# Patient Record
Sex: MALE | Race: WHITE | NOT HISPANIC OR LATINO | Employment: UNEMPLOYED | URBAN - METROPOLITAN AREA
[De-identification: names, ages, dates, MRNs, and addresses within clinical notes are randomized per-mention and may not be internally consistent; named-entity substitution may affect disease eponyms.]

---

## 2018-06-19 ENCOUNTER — NON-PROVIDER VISIT (OUTPATIENT)
Dept: URGENT CARE | Facility: CLINIC | Age: 58
End: 2018-06-19

## 2018-06-19 DIAGNOSIS — Z02.1 PRE-EMPLOYMENT DRUG SCREENING: ICD-10-CM

## 2018-06-19 LAB
AMP AMPHETAMINE: NORMAL
BAR BARBITURATES: NORMAL
BZO BENZODIAZEPINES: NORMAL
COC COCAINE: NORMAL
INT CON NEG: NORMAL
INT CON POS: NORMAL
MDMA ECSTASY: NORMAL
MET METHAMPHETAMINES: NORMAL
MTD METHADONE: NORMAL
OPI OPIATES: NORMAL
OXY OXYCODONE: NORMAL
PCP PHENCYCLIDINE: NORMAL
POC URINE DRUG SCREEN OCDRS: NEGATIVE
THC: NORMAL

## 2018-06-19 PROCEDURE — 80305 DRUG TEST PRSMV DIR OPT OBS: CPT | Performed by: PHYSICIAN ASSISTANT

## 2018-07-12 ENCOUNTER — NON-PROVIDER VISIT (OUTPATIENT)
Dept: URGENT CARE | Facility: PHYSICIAN GROUP | Age: 58
End: 2018-07-12

## 2018-07-12 DIAGNOSIS — Z02.1 PRE-EMPLOYMENT DRUG SCREENING: ICD-10-CM

## 2018-07-12 LAB
AMP AMPHETAMINE: NORMAL
COC COCAINE: NORMAL
INT CON NEG: NORMAL
INT CON POS: NORMAL
MET METHAMPHETAMINES: NORMAL
OPI OPIATES: NORMAL
PCP PHENCYCLIDINE: NORMAL
POC DRUG COMMENT 753798-OCCUPATIONAL HEALTH: NORMAL
THC: NORMAL

## 2018-07-12 PROCEDURE — 80305 DRUG TEST PRSMV DIR OPT OBS: CPT | Performed by: FAMILY MEDICINE

## 2020-09-29 ENCOUNTER — HOSPITAL ENCOUNTER (OUTPATIENT)
Dept: RADIOLOGY | Facility: MEDICAL CENTER | Age: 60
End: 2020-09-29
Attending: STUDENT IN AN ORGANIZED HEALTH CARE EDUCATION/TRAINING PROGRAM
Payer: COMMERCIAL

## 2020-09-29 DIAGNOSIS — M25.561 RIGHT KNEE PAIN, UNSPECIFIED CHRONICITY: ICD-10-CM

## 2020-09-29 PROCEDURE — 73721 MRI JNT OF LWR EXTRE W/O DYE: CPT | Mod: RT

## 2020-11-25 ENCOUNTER — PRE-ADMISSION TESTING (OUTPATIENT)
Dept: ADMISSIONS | Facility: MEDICAL CENTER | Age: 60
End: 2020-11-25
Attending: ORTHOPAEDIC SURGERY
Payer: COMMERCIAL

## 2020-11-25 DIAGNOSIS — Z01.812 PRE-OPERATIVE LABORATORY EXAMINATION: ICD-10-CM

## 2020-11-25 DIAGNOSIS — Z01.810 PRE-OPERATIVE CARDIOVASCULAR EXAMINATION: ICD-10-CM

## 2020-11-25 LAB
ANION GAP SERPL CALC-SCNC: 9 MMOL/L (ref 7–16)
BUN SERPL-MCNC: 12 MG/DL (ref 8–22)
CALCIUM SERPL-MCNC: 8.9 MG/DL (ref 8.4–10.2)
CHLORIDE SERPL-SCNC: 104 MMOL/L (ref 96–112)
CO2 SERPL-SCNC: 25 MMOL/L (ref 20–33)
COVID ORDER STATUS COVID19: NORMAL
CREAT SERPL-MCNC: 0.87 MG/DL (ref 0.5–1.4)
EKG IMPRESSION: NORMAL
GLUCOSE SERPL-MCNC: 95 MG/DL (ref 65–99)
POTASSIUM SERPL-SCNC: 4.9 MMOL/L (ref 3.6–5.5)
SODIUM SERPL-SCNC: 138 MMOL/L (ref 135–145)

## 2020-11-25 PROCEDURE — 80048 BASIC METABOLIC PNL TOTAL CA: CPT

## 2020-11-25 PROCEDURE — 36415 COLL VENOUS BLD VENIPUNCTURE: CPT

## 2020-11-25 PROCEDURE — 93005 ELECTROCARDIOGRAM TRACING: CPT

## 2020-11-25 PROCEDURE — U0003 INFECTIOUS AGENT DETECTION BY NUCLEIC ACID (DNA OR RNA); SEVERE ACUTE RESPIRATORY SYNDROME CORONAVIRUS 2 (SARS-COV-2) (CORONAVIRUS DISEASE [COVID-19]), AMPLIFIED PROBE TECHNIQUE, MAKING USE OF HIGH THROUGHPUT TECHNOLOGIES AS DESCRIBED BY CMS-2020-01-R: HCPCS

## 2020-11-25 PROCEDURE — C9803 HOPD COVID-19 SPEC COLLECT: HCPCS

## 2020-11-25 PROCEDURE — 93010 ELECTROCARDIOGRAM REPORT: CPT | Performed by: INTERNAL MEDICINE

## 2020-11-25 RX ORDER — ATORVASTATIN CALCIUM 40 MG/1
40 TABLET, FILM COATED ORAL
COMMUNITY
Start: 2020-11-20 | End: 2021-10-06

## 2020-11-25 RX ORDER — MELOXICAM 7.5 MG/1
7.5 TABLET ORAL
COMMUNITY
Start: 2020-09-01 | End: 2020-11-25

## 2020-11-25 RX ORDER — ACETAMINOPHEN 325 MG/1
650 TABLET ORAL EVERY 4 HOURS PRN
COMMUNITY
End: 2021-10-06

## 2020-11-25 RX ORDER — MELOXICAM 15 MG/1
15 TABLET ORAL
COMMUNITY
Start: 2020-11-20 | End: 2020-11-25

## 2020-11-25 NOTE — OR NURSING
"Preadmit appointment: \" Preparing for your Procedure information\" sheet given to patient with verbal and written instructions. Patient instructed to continue prescribed medications through the day before surgery, instructed to take the following medications the day of surgery per anesthesia protocol:  Tylenol PRN.  Pt states, \"no issues with anesthesia\".  Anesthesia Fasting guidelines handout given to Pt, NPO 12 hours prior to surgery and clear liquids up to 3 hours prior to surgery, clear liquids defined for Pt    All Pt's questions and concerns answered or addressed.  COVID test 11/25  "

## 2020-11-27 LAB
SARS-COV-2 RNA RESP QL NAA+PROBE: NOTDETECTED
SPECIMEN SOURCE: NORMAL

## 2020-11-30 ENCOUNTER — ANESTHESIA EVENT (OUTPATIENT)
Dept: SURGERY | Facility: MEDICAL CENTER | Age: 60
End: 2020-11-30
Payer: COMMERCIAL

## 2020-11-30 ENCOUNTER — HOSPITAL ENCOUNTER (OUTPATIENT)
Facility: MEDICAL CENTER | Age: 60
End: 2020-11-30
Attending: ORTHOPAEDIC SURGERY | Admitting: ORTHOPAEDIC SURGERY
Payer: COMMERCIAL

## 2020-11-30 ENCOUNTER — ANESTHESIA (OUTPATIENT)
Dept: SURGERY | Facility: MEDICAL CENTER | Age: 60
End: 2020-11-30
Payer: COMMERCIAL

## 2020-11-30 VITALS
SYSTOLIC BLOOD PRESSURE: 129 MMHG | TEMPERATURE: 97.7 F | OXYGEN SATURATION: 92 % | HEIGHT: 68 IN | RESPIRATION RATE: 16 BRPM | HEART RATE: 79 BPM | BODY MASS INDEX: 33.28 KG/M2 | DIASTOLIC BLOOD PRESSURE: 73 MMHG | WEIGHT: 219.58 LBS

## 2020-11-30 DIAGNOSIS — S83.281A CURRENT TEAR OF LATERAL CARTILAGE OR MENISCUS OF KNEE, RIGHT, INITIAL ENCOUNTER: ICD-10-CM

## 2020-11-30 PROCEDURE — 700111 HCHG RX REV CODE 636 W/ 250 OVERRIDE (IP): Performed by: ANESTHESIOLOGY

## 2020-11-30 PROCEDURE — 160041 HCHG SURGERY MINUTES - EA ADDL 1 MIN LEVEL 4: Performed by: ORTHOPAEDIC SURGERY

## 2020-11-30 PROCEDURE — L1830 KO IMMOB CANVAS LONG PRE OTS: HCPCS | Performed by: ORTHOPAEDIC SURGERY

## 2020-11-30 PROCEDURE — 160029 HCHG SURGERY MINUTES - 1ST 30 MINS LEVEL 4: Performed by: ORTHOPAEDIC SURGERY

## 2020-11-30 PROCEDURE — 160009 HCHG ANES TIME/MIN: Performed by: ORTHOPAEDIC SURGERY

## 2020-11-30 PROCEDURE — A9270 NON-COVERED ITEM OR SERVICE: HCPCS | Performed by: ANESTHESIOLOGY

## 2020-11-30 PROCEDURE — 502580 HCHG PACK, KNEE ARTHROSCOPY: Performed by: ORTHOPAEDIC SURGERY

## 2020-11-30 PROCEDURE — 700105 HCHG RX REV CODE 258: Performed by: ORTHOPAEDIC SURGERY

## 2020-11-30 PROCEDURE — 501838 HCHG SUTURE GENERAL: Performed by: ORTHOPAEDIC SURGERY

## 2020-11-30 PROCEDURE — E0114 CRUTCH UNDERARM PAIR NO WOOD: HCPCS | Performed by: ORTHOPAEDIC SURGERY

## 2020-11-30 PROCEDURE — 160002 HCHG RECOVERY MINUTES (STAT): Performed by: ORTHOPAEDIC SURGERY

## 2020-11-30 PROCEDURE — 160035 HCHG PACU - 1ST 60 MINS PHASE I: Performed by: ORTHOPAEDIC SURGERY

## 2020-11-30 PROCEDURE — 700111 HCHG RX REV CODE 636 W/ 250 OVERRIDE (IP): Performed by: ORTHOPAEDIC SURGERY

## 2020-11-30 PROCEDURE — 700101 HCHG RX REV CODE 250: Performed by: ORTHOPAEDIC SURGERY

## 2020-11-30 PROCEDURE — 700101 HCHG RX REV CODE 250: Performed by: ANESTHESIOLOGY

## 2020-11-30 PROCEDURE — 160046 HCHG PACU - 1ST 60 MINS PHASE II: Performed by: ORTHOPAEDIC SURGERY

## 2020-11-30 PROCEDURE — 160025 RECOVERY II MINUTES (STATS): Performed by: ORTHOPAEDIC SURGERY

## 2020-11-30 PROCEDURE — 160048 HCHG OR STATISTICAL LEVEL 1-5: Performed by: ORTHOPAEDIC SURGERY

## 2020-11-30 PROCEDURE — 700102 HCHG RX REV CODE 250 W/ 637 OVERRIDE(OP): Performed by: ANESTHESIOLOGY

## 2020-11-30 RX ORDER — CELECOXIB 200 MG/1
400 CAPSULE ORAL ONCE
Status: COMPLETED | OUTPATIENT
Start: 2020-11-30 | End: 2020-11-30

## 2020-11-30 RX ORDER — OXYCODONE HCL 5 MG/5 ML
10 SOLUTION, ORAL ORAL
Status: COMPLETED | OUTPATIENT
Start: 2020-11-30 | End: 2020-11-30

## 2020-11-30 RX ORDER — HYDROMORPHONE HYDROCHLORIDE 1 MG/ML
0.2 INJECTION, SOLUTION INTRAMUSCULAR; INTRAVENOUS; SUBCUTANEOUS
Status: DISCONTINUED | OUTPATIENT
Start: 2020-11-30 | End: 2020-11-30 | Stop reason: HOSPADM

## 2020-11-30 RX ORDER — MIDAZOLAM HYDROCHLORIDE 1 MG/ML
1 INJECTION INTRAMUSCULAR; INTRAVENOUS
Status: DISCONTINUED | OUTPATIENT
Start: 2020-11-30 | End: 2020-11-30 | Stop reason: HOSPADM

## 2020-11-30 RX ORDER — ACETAMINOPHEN 500 MG
1000 TABLET ORAL ONCE
Status: COMPLETED | OUTPATIENT
Start: 2020-11-30 | End: 2020-11-30

## 2020-11-30 RX ORDER — ONDANSETRON 2 MG/ML
4 INJECTION INTRAMUSCULAR; INTRAVENOUS
Status: DISCONTINUED | OUTPATIENT
Start: 2020-11-30 | End: 2020-11-30 | Stop reason: HOSPADM

## 2020-11-30 RX ORDER — HALOPERIDOL 5 MG/ML
1 INJECTION INTRAMUSCULAR
Status: DISCONTINUED | OUTPATIENT
Start: 2020-11-30 | End: 2020-11-30 | Stop reason: HOSPADM

## 2020-11-30 RX ORDER — HYDRALAZINE HYDROCHLORIDE 20 MG/ML
5 INJECTION INTRAMUSCULAR; INTRAVENOUS
Status: DISCONTINUED | OUTPATIENT
Start: 2020-11-30 | End: 2020-11-30 | Stop reason: HOSPADM

## 2020-11-30 RX ORDER — BUPIVACAINE HYDROCHLORIDE 2.5 MG/ML
INJECTION, SOLUTION EPIDURAL; INFILTRATION; INTRACAUDAL
Status: DISCONTINUED | OUTPATIENT
Start: 2020-11-30 | End: 2020-11-30 | Stop reason: HOSPADM

## 2020-11-30 RX ORDER — ONDANSETRON 2 MG/ML
INJECTION INTRAMUSCULAR; INTRAVENOUS PRN
Status: DISCONTINUED | OUTPATIENT
Start: 2020-11-30 | End: 2020-11-30 | Stop reason: SURG

## 2020-11-30 RX ORDER — HYDROMORPHONE HYDROCHLORIDE 1 MG/ML
0.1 INJECTION, SOLUTION INTRAMUSCULAR; INTRAVENOUS; SUBCUTANEOUS
Status: DISCONTINUED | OUTPATIENT
Start: 2020-11-30 | End: 2020-11-30 | Stop reason: HOSPADM

## 2020-11-30 RX ORDER — MEPERIDINE HYDROCHLORIDE 25 MG/ML
12.5 INJECTION INTRAMUSCULAR; INTRAVENOUS; SUBCUTANEOUS
Status: DISCONTINUED | OUTPATIENT
Start: 2020-11-30 | End: 2020-11-30 | Stop reason: HOSPADM

## 2020-11-30 RX ORDER — KETOROLAC TROMETHAMINE 30 MG/ML
INJECTION, SOLUTION INTRAMUSCULAR; INTRAVENOUS PRN
Status: DISCONTINUED | OUTPATIENT
Start: 2020-11-30 | End: 2020-11-30 | Stop reason: SURG

## 2020-11-30 RX ORDER — SODIUM CHLORIDE, SODIUM LACTATE, POTASSIUM CHLORIDE, CALCIUM CHLORIDE 600; 310; 30; 20 MG/100ML; MG/100ML; MG/100ML; MG/100ML
INJECTION, SOLUTION INTRAVENOUS CONTINUOUS
Status: DISCONTINUED | OUTPATIENT
Start: 2020-11-30 | End: 2020-11-30 | Stop reason: HOSPADM

## 2020-11-30 RX ORDER — METOPROLOL TARTRATE 1 MG/ML
1 INJECTION, SOLUTION INTRAVENOUS
Status: DISCONTINUED | OUTPATIENT
Start: 2020-11-30 | End: 2020-11-30 | Stop reason: HOSPADM

## 2020-11-30 RX ORDER — MIDAZOLAM HYDROCHLORIDE 1 MG/ML
INJECTION INTRAMUSCULAR; INTRAVENOUS PRN
Status: DISCONTINUED | OUTPATIENT
Start: 2020-11-30 | End: 2020-11-30 | Stop reason: SURG

## 2020-11-30 RX ORDER — CEFAZOLIN SODIUM 1 G/3ML
INJECTION, POWDER, FOR SOLUTION INTRAMUSCULAR; INTRAVENOUS PRN
Status: DISCONTINUED | OUTPATIENT
Start: 2020-11-30 | End: 2020-11-30 | Stop reason: SURG

## 2020-11-30 RX ORDER — OXYCODONE HCL 5 MG/5 ML
5 SOLUTION, ORAL ORAL
Status: COMPLETED | OUTPATIENT
Start: 2020-11-30 | End: 2020-11-30

## 2020-11-30 RX ORDER — DIPHENHYDRAMINE HYDROCHLORIDE 50 MG/ML
12.5 INJECTION INTRAMUSCULAR; INTRAVENOUS
Status: DISCONTINUED | OUTPATIENT
Start: 2020-11-30 | End: 2020-11-30 | Stop reason: HOSPADM

## 2020-11-30 RX ORDER — GABAPENTIN 300 MG/1
300 CAPSULE ORAL ONCE
Status: COMPLETED | OUTPATIENT
Start: 2020-11-30 | End: 2020-11-30

## 2020-11-30 RX ORDER — HYDROMORPHONE HYDROCHLORIDE 1 MG/ML
0.4 INJECTION, SOLUTION INTRAMUSCULAR; INTRAVENOUS; SUBCUTANEOUS
Status: DISCONTINUED | OUTPATIENT
Start: 2020-11-30 | End: 2020-11-30 | Stop reason: HOSPADM

## 2020-11-30 RX ORDER — LABETALOL HYDROCHLORIDE 5 MG/ML
5 INJECTION, SOLUTION INTRAVENOUS
Status: DISCONTINUED | OUTPATIENT
Start: 2020-11-30 | End: 2020-11-30 | Stop reason: HOSPADM

## 2020-11-30 RX ORDER — LIDOCAINE HYDROCHLORIDE 20 MG/ML
INJECTION, SOLUTION EPIDURAL; INFILTRATION; INTRACAUDAL; PERINEURAL PRN
Status: DISCONTINUED | OUTPATIENT
Start: 2020-11-30 | End: 2020-11-30 | Stop reason: HOSPADM

## 2020-11-30 RX ORDER — EPINEPHRINE 1 MG/ML(1)
AMPUL (ML) INJECTION
Status: DISCONTINUED | OUTPATIENT
Start: 2020-11-30 | End: 2020-11-30 | Stop reason: HOSPADM

## 2020-11-30 RX ORDER — DEXAMETHASONE SODIUM PHOSPHATE 4 MG/ML
INJECTION, SOLUTION INTRA-ARTICULAR; INTRALESIONAL; INTRAMUSCULAR; INTRAVENOUS; SOFT TISSUE PRN
Status: DISCONTINUED | OUTPATIENT
Start: 2020-11-30 | End: 2020-11-30 | Stop reason: SURG

## 2020-11-30 RX ADMIN — FENTANYL CITRATE 100 MCG: 50 INJECTION, SOLUTION INTRAMUSCULAR; INTRAVENOUS at 12:57

## 2020-11-30 RX ADMIN — OXYCODONE HYDROCHLORIDE 10 MG: 5 SOLUTION ORAL at 14:17

## 2020-11-30 RX ADMIN — ONDANSETRON 4 MG: 2 INJECTION INTRAMUSCULAR; INTRAVENOUS at 13:43

## 2020-11-30 RX ADMIN — PROPOFOL 160 MG: 10 INJECTION, EMULSION INTRAVENOUS at 13:00

## 2020-11-30 RX ADMIN — FENTANYL CITRATE 50 MCG: 50 INJECTION, SOLUTION INTRAMUSCULAR; INTRAVENOUS at 13:21

## 2020-11-30 RX ADMIN — ACETAMINOPHEN 1000 MG: 500 TABLET ORAL at 10:56

## 2020-11-30 RX ADMIN — LIDOCAINE HYDROCHLORIDE 100 MG: 20 INJECTION, SOLUTION EPIDURAL; INFILTRATION; INTRACAUDAL; PERINEURAL at 13:00

## 2020-11-30 RX ADMIN — GABAPENTIN 300 MG: 300 CAPSULE ORAL at 10:56

## 2020-11-30 RX ADMIN — DEXAMETHASONE SODIUM PHOSPHATE 4 MG: 4 INJECTION, SOLUTION INTRAMUSCULAR; INTRAVENOUS at 13:00

## 2020-11-30 RX ADMIN — EPHEDRINE SULFATE 5 MG: 50 INJECTION INTRAMUSCULAR; INTRAVENOUS; SUBCUTANEOUS at 13:32

## 2020-11-30 RX ADMIN — CEFAZOLIN 2 G: 1 INJECTION, POWDER, FOR SOLUTION INTRAVENOUS at 13:00

## 2020-11-30 RX ADMIN — SODIUM CHLORIDE, POTASSIUM CHLORIDE, SODIUM LACTATE AND CALCIUM CHLORIDE: 600; 310; 30; 20 INJECTION, SOLUTION INTRAVENOUS at 10:57

## 2020-11-30 RX ADMIN — SODIUM CHLORIDE, POTASSIUM CHLORIDE, SODIUM LACTATE AND CALCIUM CHLORIDE: 600; 310; 30; 20 INJECTION, SOLUTION INTRAVENOUS at 13:43

## 2020-11-30 RX ADMIN — FENTANYL CITRATE 50 MCG: 50 INJECTION, SOLUTION INTRAMUSCULAR; INTRAVENOUS at 13:25

## 2020-11-30 RX ADMIN — FENTANYL CITRATE 50 MCG: 50 INJECTION, SOLUTION INTRAMUSCULAR; INTRAVENOUS at 14:18

## 2020-11-30 RX ADMIN — EPHEDRINE SULFATE 5 MG: 50 INJECTION INTRAMUSCULAR; INTRAVENOUS; SUBCUTANEOUS at 13:21

## 2020-11-30 RX ADMIN — KETOROLAC TROMETHAMINE 30 MG: 30 INJECTION, SOLUTION INTRAMUSCULAR at 13:43

## 2020-11-30 RX ADMIN — CELECOXIB 400 MG: 200 CAPSULE ORAL at 10:56

## 2020-11-30 RX ADMIN — WATER 15 ML: 100 IRRIGANT IRRIGATION at 10:56

## 2020-11-30 RX ADMIN — MIDAZOLAM HYDROCHLORIDE 2 MG: 1 INJECTION, SOLUTION INTRAMUSCULAR; INTRAVENOUS at 12:55

## 2020-11-30 ASSESSMENT — PAIN DESCRIPTION - PAIN TYPE
TYPE: SURGICAL PAIN
TYPE: ACUTE PAIN;SURGICAL PAIN
TYPE: SURGICAL PAIN
TYPE: SURGICAL PAIN
TYPE: ACUTE PAIN;SURGICAL PAIN

## 2020-11-30 ASSESSMENT — PAIN SCALES - GENERAL: PAIN_LEVEL: 5

## 2020-11-30 NOTE — OR NURSING
1351- Patient arrived from OR post ARTHROSCOPY, KNEE - DEBRIDEMENT - Right; CHONDROPLASTY - FOR OPEN BIPARTITE PATELLA EXCISION - Right. Respirations spontaneous and unlabored via oral airway. VSS, see flowsheets. Surgical dressing to R knee C/D/I with immobilizer in place. 2+ bilateral pedal pulses. Toes warm and pink with brisk cap refill.   1417- Patient reporting 8/10 R knee pain. Medicated per MAR. O2 discontinued at this time.   1424- Patient reporting improvement in pain. Currently 7/10 burning pain. Declines need for additional pain medication at this time.   1448- Patient continues to report tolerable pain and declines need for additional pain medication. Meets criteria for Stage 2.

## 2020-11-30 NOTE — ANESTHESIA PROCEDURE NOTES
Airway    Date/Time: 11/30/2020 1:01 PM  Performed by: Lionel Dodd M.D.  Authorized by: Lionel Dodd M.D.     Location:  OR  Urgency:  Elective  Indications for Airway Management:  Anesthesia      Spontaneous Ventilation: absent    Sedation Level:  Deep  Preoxygenated: Yes    Mask Difficulty Assessment:  1 - vent by mask  Final Airway Type:  Supraglottic airway  Final Supraglottic Airway:  Standard LMA    SGA Size:  5  Number of Attempts at Approach:  1

## 2020-11-30 NOTE — OR NURSING
1448: Patient arrives in phase II.    1515: Patient provided with crutches and education for use with demo.    1520: D/Karthik to care of family post uneventful stay in PACU 2.

## 2020-11-30 NOTE — ANESTHESIA POSTPROCEDURE EVALUATION
Patient: Migel Owusu    Procedure Summary     Date: 11/30/20 Room / Location:  OR  / SURGERY [facility]    Anesthesia Start: 1255 Anesthesia Stop: 1356    Procedures:       ARTHROSCOPY, KNEE - DEBRIDEMENT (Right Knee)      CHONDROPLASTY - FOR OPEN BIPARTITE PATELLA EXCISION (Right Knee) Diagnosis: (BIFID PATELLA)    Surgeons: Navdeep Aguilar M.D. Responsible Provider: Lionel Dodd M.D.    Anesthesia Type: general ASA Status: 2          Final Anesthesia Type: general  Last vitals  BP   Blood Pressure: 129/73    Temp   36.5 °C (97.7 °F)    Pulse   Pulse: 79   Resp   16    SpO2   92 %      Anesthesia Post Evaluation    Patient location during evaluation: PACU  Patient participation: complete - patient participated  Level of consciousness: awake and alert  Pain score: 5    Airway patency: patent  Anesthetic complications: no  Cardiovascular status: hemodynamically stable  Respiratory status: acceptable  Hydration status: euvolemic    PONV: none

## 2020-11-30 NOTE — ANESTHESIA TIME REPORT
Anesthesia Start and Stop Event Times     Date Time Event    11/30/2020 1240 Ready for Procedure     1255 Anesthesia Start     1356 Anesthesia Stop        Responsible Staff  11/30/20    Name Role Begin End    Lionel Dodd M.D. Anesth 1255 1356        Preop Diagnosis (Free Text):  Pre-op Diagnosis     BIFID PATELLA        Preop Diagnosis (Codes):    Post op Diagnosis  Bifid patella      Premium Reason  Non-Premium    Comments:

## 2020-11-30 NOTE — ANESTHESIA QCDR
2019 [facility] Clinical Data Registry (for Quality Improvement)     Postoperative nausea/vomiting risk protocol (Adult = 18 yrs and Pediatric 3-17 yrs)- (430 and 463)  General inhalation anesthetic (NOT TIVA) with PONV risk factors: No  Provision of anti-emetic therapy with at least 2 different classes of agents: N/A  Patient DID NOT receive anti-emetic therapy and reason is documented in Medical Record: N/A    Multimodal Pain Management- (477)  Non-emergent surgery AND patient age >= 18: Yes  Use of Multimodal Pain Management, two or more drugs and/or interventions, NOT including systemic opioids: Yes  Exception: Documented allergy to multiple classes of analgesics: N/A    Smoking Abstinence (404)  Patient is current smoker (cigarette, pipe, e-cig, marijuanna): Yes  Elective Surgery: Yes  Abstinence instructions provided prior to day of surgery: Yes  Patient abstained from smoking on day of surgery: No    Pre-Op Beta-Blocker in Isolated CABG (44)  Isolated CABG AND patient age >= 18: No  Beta-blocker admin within 24 hours of surgical incision:   Exception:of medical reason(s) for not administering beta blocker within 24 hours prior to surgical incision (e.g., not  indicated,other medical reason):     PACU assessment of acute postoperative pain prior to Anesthesia Care End- Applies to Patients Age = 18- (ABG7)  Initial PACU pain score is which of the following: < 7/10  Patient unable to report pain score: N/A    Post-anesthetic transfer of care checklist/protocol to PACU/ICU- (426 and 427)  Upon conclusion of case, patient transferred to which of the following locations: PACU/Non-ICU  Use of transfer checklist/protocol: Yes  Exclusion: Service Performed in Patient Hospital Room (and thus did not require transfer): N/A  Unplanned admission to ICU related to anesthesia service up through end of PACU care- (MD51)  Unplanned admission to ICU (not initially anticipated at anesthesia start time): No

## 2020-11-30 NOTE — OP REPORT
DATE OF SERVICE:  11/30/2020    PREOPERATIVE DIAGNOSES:  1.  Symptomatic right bipartite patella.  2.  Chondrosis of patella and medial femoral condyle.    POSTOPERATIVE DIAGNOSES:  1.  Right knee symptomatic bipartite patella.  2.  Right knee lateral meniscal tear.  3.  Right knee grade II to III chondral lesions of patella and medial femoral   condyle.    PROCEDURES:  1.  Right knee arthroscopy with partial lateral meniscectomy.  2.  Right knee arthroscopy with chondroplasty of patella and medial femoral   condyle.  3.  Right knee open partial patellectomy with excision of bipartite fragment.    SURGEON:  Navdeep Aguilar MD    ANESTHESIA:  General.    BLOOD LOSS:  Minimal.    TOURNIQUET:  Right thigh 50 minutes at 250 mmHg.    INDICATION:  The patient is a 60-year-old gentleman who developed symptomatic   right bipartite patella, refractory to conservative management.  MRI also   shows chondrosis of the medial femoral condyle and patella.  He is indicated   for arthroscopic management.    FINDINGS:  Exam under anesthesia revealed no significant effusion, full range   of motion, ligamentous exam is stable.  Arthroscopic examination of the   suprapatellar pouch and medial and lateral gutters was unremarkable.    Examination of the patella and trochlea revealed a small central grade II to   III chondral lesion with some loose chondral flaps on the patella.  There was   a superior-lateral bipartite fragment that had gross motion with probing.    Examination of the notch revealed the cruciate ligaments to be intact.    Examination of the lateral compartment revealed some fraying and fibrillation   of the posterior horn of the lateral meniscus adjacent to the root with the   root intact.  Examination of the medial compartment revealed a small 1 cm   diameter grade III chondral lesion with some loose chondral flaps.  The   meniscus was intact.    DESCRIPTION OF PROCEDURE:  Following induction of general anesthesia,  time-out   was performed confirming patient, site, and procedure.  Two grams of Ancef IV   were ordered and administered.  Right thigh tourniquet and thigh almeida were   applied and left leg was placed in a well leg almeida.  Under sterile   conditions, the right knee was injected with 30 mL of 0.25% plain Marcaine in   the standard fashion.  The right lower extremity was then prepped and draped   in the usual fashion.  Standard anterolateral and anteromedial portals with   supralateral outflow were established and diagnostic arthroscopy was performed   with the findings as above.  The shaver was used to debride the fraying and   fibrillation of the posterior horn of the lateral meniscus and to smooth the   remaining meniscus.  The shaver was used to debride the loose chondral flaps   from the medial femoral condyle and from the patella.  The arthroscope was   withdrawn.    The extremity was exsanguinated and the tourniquet was inflated.  A 3 cm   longitudinal incision centered over the superolateral pole of the patella was   made.  Dissection was carried down sharply through the subcutaneous tissues.    The lateral retinaculum was elevated subperiosteally off of the superior   lateral aspect of the patella and the synchondrosis site was identified.  The   synchondrosis was developed and released with use of a 15 blade and a freer   until the fragment could be grasped with the Kocher.  The Bovie was then used   to release remaining soft tissue attachments to the bipartite fragment, which   was removed.  The wound was irrigated.  The longitudinal incision in the   retinaculum was loosely approximated with 2-0 Vicryl simple sutures.  This   maintained appropriate mobility of the patella with no undue tension with knee   flexion.  The tourniquet was deflated.  Skin was closed in layers with 2-0   Vicryl subcutaneous and staples on skin and portals.  30 mL of 0.25% plain   Marcaine was injected into the joint.  Sterile  dressing was applied followed   by JANUSZ hose and knee immobilizer.  The patient was awakened and extubated in   the operating room and transferred to recovery room in stable condition.       ____________________________________     MD MANSOOR Parisi / LIT    DD:  11/30/2020 13:55:31  DT:  11/30/2020 14:44:50    D#:  [id number]  Job#:  [id number]

## 2020-11-30 NOTE — ANESTHESIA PREPROCEDURE EVALUATION
Relevant Problems   CARDIAC   (+) Aneurysm of abdominal aorta (HCC)       Physical Exam    Airway   Mallampati: II  TM distance: >3 FB  Neck ROM: full       Cardiovascular - normal exam  Rhythm: regular  Rate: normal  (-) murmur     Dental - normal exam    Comments: Multiple missing         Pulmonary - normal exam  Breath sounds clear to auscultation     Abdominal    Neurological - normal exam               Anesthesia Plan    ASA 2       Plan - general       Airway plan will be LMA        Induction: intravenous    Postoperative Plan: Postoperative administration of opioids is intended.    Pertinent diagnostic labs and testing reviewed    Informed Consent:    Anesthetic plan and risks discussed with patient.    Use of blood products discussed with: patient whom consented to blood products.

## 2021-09-30 ENCOUNTER — HOSPITAL ENCOUNTER (OUTPATIENT)
Dept: RADIOLOGY | Facility: MEDICAL CENTER | Age: 61
End: 2021-09-30
Attending: STUDENT IN AN ORGANIZED HEALTH CARE EDUCATION/TRAINING PROGRAM
Payer: COMMERCIAL

## 2021-09-30 DIAGNOSIS — M75.101 TEAR OF RIGHT ROTATOR CUFF, UNSPECIFIED TEAR EXTENT, UNSPECIFIED WHETHER TRAUMATIC: ICD-10-CM

## 2021-09-30 PROCEDURE — 73221 MRI JOINT UPR EXTREM W/O DYE: CPT | Mod: RT

## 2021-10-06 ENCOUNTER — OFFICE VISIT (OUTPATIENT)
Dept: MEDICAL GROUP | Facility: CLINIC | Age: 61
End: 2021-10-06
Payer: COMMERCIAL

## 2021-10-06 VITALS
RESPIRATION RATE: 16 BRPM | SYSTOLIC BLOOD PRESSURE: 135 MMHG | WEIGHT: 221.1 LBS | HEART RATE: 62 BPM | TEMPERATURE: 97.1 F | HEIGHT: 68 IN | DIASTOLIC BLOOD PRESSURE: 83 MMHG | BODY MASS INDEX: 33.51 KG/M2

## 2021-10-06 DIAGNOSIS — Z86.73 HISTORY OF TRANSIENT ISCHEMIC ATTACK: ICD-10-CM

## 2021-10-06 DIAGNOSIS — M75.101 TEAR OF RIGHT SUPRASPINATUS TENDON: ICD-10-CM

## 2021-10-06 DIAGNOSIS — M25.561 ARTHRALGIA OF RIGHT KNEE: ICD-10-CM

## 2021-10-06 PROBLEM — E66.3 OVERWEIGHT WITH BODY MASS INDEX (BMI) 25.0-29.9: Status: ACTIVE | Noted: 2020-09-01

## 2021-10-06 PROBLEM — S42.034A: Status: ACTIVE | Noted: 2021-08-13

## 2021-10-06 PROBLEM — E78.2 MIXED HYPERLIPIDEMIA: Status: ACTIVE | Noted: 2020-09-16

## 2021-10-06 PROBLEM — M75.100 ROTATOR CUFF TEAR: Status: ACTIVE | Noted: 2021-08-13

## 2021-10-06 PROBLEM — L57.0 ACTINIC KERATOSES: Status: ACTIVE | Noted: 2021-05-19

## 2021-10-06 PROCEDURE — 99214 OFFICE O/P EST MOD 30 MIN: CPT | Mod: GC | Performed by: FAMILY MEDICINE

## 2021-10-06 RX ORDER — ASPIRIN 81 MG/1
162 TABLET, CHEWABLE ORAL DAILY
COMMUNITY
Start: 2021-09-18

## 2021-10-06 RX ORDER — FLUOROURACIL 5 MG/G
1 CREAM TOPICAL 2 TIMES DAILY
COMMUNITY
Start: 2021-06-30 | End: 2022-05-06

## 2021-10-06 RX ORDER — ATORVASTATIN CALCIUM 80 MG/1
80 TABLET, FILM COATED ORAL
COMMUNITY
Start: 2021-09-20

## 2021-10-06 RX ORDER — CLOPIDOGREL BISULFATE 75 MG/1
75 TABLET ORAL DAILY
COMMUNITY
Start: 2021-09-18 | End: 2022-03-21

## 2021-10-06 ASSESSMENT — PATIENT HEALTH QUESTIONNAIRE - PHQ9: CLINICAL INTERPRETATION OF PHQ2 SCORE: 0

## 2021-10-06 NOTE — PROGRESS NOTES
"[facility] FAMILY MEDICINE OFFICE VISIT    Date: 10/6/2021    MRN: [id number]  Patient ID: Migel Owusu    SUBJECTIVE:  Migel Owusu is a 61 y.o. man here for follow-up management of right shoulder pain, right knee pain, and recent TIA.  With respect to right shoulder pain, Migel reports that pain has not improved since first injured several months ago.  Has noticed that he still has very limited range of motion.  Patient also reports significant weakness with the right arm.    With respect to right knee pain, Migel reports this is been an ongoing issue for many years.  Uses a cane to walk.  Patient had a surgery with Dr. Aguilar of orthopedics around a year ago which included arthroscopy and chondroplasty with excision of bipartite patella.  Migel reports that after his surgery, he did not experience total alleviation of pain as expected, and when he followed up with surgeon he was told that the only thing to be done for this were \"joint injections.\"  Migel reports that he would like a second opinion on the matter as his knee has continued to cause him significant pain.    With respect to TIA, Migel reports that he has [facility].  Migel reports that he is continuing clopidogrel and aspirin, though his clopidogrel is supposed to be ceased in the next few days as he runs out of medication.  He was told to continue taking aspirin 325 mg daily once he runs out of his 162 mg dose.  As of yet, no records have been sent to this office regarding his prior TIA at [facility].  Migel continues to report no residual deficits from this TIA.    PMHx/PSHx:  Past Medical History:   Diagnosis Date   • Abdominal aortic aneurysm (HCC)    • Cancer of lower lip, vermilion border     Unknown pathology   • Snoring      Past Surgical History:   Procedure Laterality Date   • PB KNEE SCOPE,DIAGNOSTIC Right 11/30/2020 "    Procedure: ARTHROSCOPY, KNEE - DEBRIDEMENT;  Surgeon: Navdeep Aguilar M.D.;  Location: SURGERY [facility];  Service: Orthopedics   • CHONDROPLASTY Right 11/30/2020    Procedure: CHONDROPLASTY - FOR OPEN BIPARTITE PATELLA EXCISION;  Surgeon: Navdeep Aguilar M.D.;  Location: SURGERY [facility];  Service: Orthopedics   • ABDOMINAL AORTIC ANEURYSM  2011       Allergies: Patient has no known allergies.    OBJECTIVE:  Vitals:    10/06/21 0819   BP: 135/83   Pulse: 62   Resp: 16   Temp: 36.2 °C (97.1 °F)       Physical Examination:  General: Well appearing man in no acute distress, resting on arrival to room  HEENT: Normocephalic, atraumatic, EOMI  Cardiovascular: RRR, no murmurs, gallops, or rubs  Pulmonary: CTAB, symmetrical chest expansion, no rales, rhonchi, or wheezes  Extremities/MSK: Moves all spontaneously, uses cane to support right leg, positive supraspinatus strength test of right side, negative Yergason's test of right arm, limited abduction/extension of right arm relative to left shoulder, non-tender to palpation of right patella, medial/lateral joint lines, and posterior bursa  Neurological: Alert and oriented     ASSESSMENT & PLAN:  Migel Owusu is a 61 y.o. man with full-thickness tear of right supraspinatus tendon, as well as history of TIA and arthralgia of the right knee.    1. Tear of right supraspinatus tendon  REFERRAL TO ORTHOPEDICS   2. History of transient ischemic attack     3. Arthralgia of right knee  REFERRAL TO ORTHOPEDICS       Orders Placed This Encounter   • REFERRAL TO ORTHOPEDICS   • fluoruracil (CARAC) 0.5 % cream   • atorvastatin (LIPITOR) 80 MG tablet   • EQ ASPIRIN LOW DOSE 81 MG Chew Tab chewable tablet   • clopidogrel (PLAVIX) 75 MG Tab       #Right supraspinatus tendon tear  Reviewed recent MRI results which reveal full-thickness tear of right supraspinatus tendon as well as bursitis and infraspinatus tendinopathy and labrum tears.  Given extent of injury as well as more  recent occurrence, at this time will refer patient to orthopedic surgery for determination of whether surgical intervention warranted.  Place referral and provided patient with information to contact [facility] office should he not hear back on his referral within the next few weeks.  Patient verbalized understanding and agreement plan of care.    #History of TIA  Reviewed medications for TIA with patient including clopidogrel, atorvastatin, aspirin and updated his medication list.  Appears the patient is currently being managed by another group in [facility] for his TIA, who may have better access to records then this office does at present.  Have attempted to obtain these records, however fax machines at this office presently not working and therefore cannot receive these from [facility] at this time.  Advised Migel to continue to follow-up with [facility] for the time being and that physician will attempt to obtain records from [facility] once able to do so.  Advised patient to follow-up in 1 month for review of medications and hopefully at that time we will have these records.  Could also consider the use of low-dose lisinopril given blood pressure in the 130s with known history of stroke.  We will discuss this with patient further at next visit.    #Arthralgia right knee  Patient with longstanding history of injury to the right knee of unknown etiology.  Has had work-up as well as surgical intervention with [facility] here in [facility].  Pain in right knee significant enough to limit mobility unless using a cane for gait.  Patient desires referral to alternative orthopedics group at this time for a second opinion.  As already referring for right supraspinatus tendon tear as above, will also place referral for evaluation of right knee at this time.  Placed referral.    Eddie Isaac M.D.  Family Medicine Resident  PGY-3

## 2022-02-17 ENCOUNTER — APPOINTMENT (OUTPATIENT)
Dept: MEDICAL GROUP | Facility: CLINIC | Age: 62
End: 2022-02-17
Payer: COMMERCIAL

## 2022-03-21 ENCOUNTER — APPOINTMENT (OUTPATIENT)
Dept: RADIOLOGY | Facility: CLINIC | Age: 62
End: 2022-03-21
Attending: FAMILY MEDICINE
Payer: COMMERCIAL

## 2022-03-21 ENCOUNTER — OFFICE VISIT (OUTPATIENT)
Dept: MEDICAL GROUP | Facility: CLINIC | Age: 62
End: 2022-03-21

## 2022-03-21 VITALS
OXYGEN SATURATION: 98 % | SYSTOLIC BLOOD PRESSURE: 140 MMHG | DIASTOLIC BLOOD PRESSURE: 78 MMHG | BODY MASS INDEX: 31.55 KG/M2 | WEIGHT: 213 LBS | HEART RATE: 60 BPM | TEMPERATURE: 97.4 F | HEIGHT: 69 IN

## 2022-03-21 DIAGNOSIS — G89.29 CHRONIC PAIN OF RIGHT KNEE: ICD-10-CM

## 2022-03-21 DIAGNOSIS — S83.8X1A INJURY OF MENISCUS OF RIGHT KNEE, INITIAL ENCOUNTER: ICD-10-CM

## 2022-03-21 DIAGNOSIS — M25.561 CHRONIC PAIN OF RIGHT KNEE: ICD-10-CM

## 2022-03-21 PROCEDURE — 73562 X-RAY EXAM OF KNEE 3: CPT | Mod: TC,RT | Performed by: FAMILY MEDICINE

## 2022-03-21 PROCEDURE — 99213 OFFICE O/P EST LOW 20 MIN: CPT | Performed by: FAMILY MEDICINE

## 2022-03-21 PROCEDURE — 73565 X-RAY EXAM OF KNEES: CPT | Mod: TC | Performed by: FAMILY MEDICINE

## 2022-03-21 ASSESSMENT — ENCOUNTER SYMPTOMS
FEVER: 0
CONSTIPATION: 0
COUGH: 0
NAUSEA: 0
PALPITATIONS: 0
VOMITING: 0
SHORTNESS OF BREATH: 0
CHILLS: 0
DIARRHEA: 0

## 2022-03-21 NOTE — ASSESSMENT & PLAN NOTE
Previous hx of surgery approx 18months ago, following surgery continued pain, pain is now limiting his activity, exam with positive greg concerning for meniscal injury, in office xrays with good joint space and mild arthritis in medial right knee, would like further imaging to identify best course of action, will get MRI to evaluate meniscus.

## 2022-03-21 NOTE — PROGRESS NOTES
Subjective:     Chief Complaint   Patient presents with   • Knee Pain     R knee pain     Migel Owusu is a 62 y.o. male here today for concern of continued knee pain following surgery over a year ago, pain is in right knee, pain is worst after sitting, impacts walking, pain is sharp and dull through entire knee, was seen last year and given referral to ortho for second opinion but due to insurance would of had to go to [facility], leg occasionally feels like it catches or wants to give out.     No problems updated.     Current medicines (including changes today)  Current Outpatient Medications   Medication Sig Dispense Refill   • atorvastatin (LIPITOR) 80 MG tablet Take 80 mg by mouth at bedtime.     • EQ ASPIRIN LOW DOSE 81 MG Chew Tab chewable tablet Chew 162 mg every day.     • fluoruracil (CARAC) 0.5 % cream Apply 1 g topically 2 times a day. (Patient not taking: Reported on 3/21/2022)     • clopidogrel (PLAVIX) 75 MG Tab Take 75 mg by mouth every day. (Patient not taking: Reported on 3/21/2022)       No current facility-administered medications for this visit.       Social History     Tobacco Use   • Smoking status: Light Tobacco Smoker     Packs/day: 0.25     Types: Cigarettes   • Smokeless tobacco: Never Used   Vaping Use   • Vaping Use: Never used   Substance Use Topics   • Alcohol use: No     Alcohol/week: 0.0 oz     Comment: quit   • Drug use: No     Past Medical History:   Diagnosis Date   • Abdominal aortic aneurysm (HCC)    • Cancer of lower lip, vermilion border     Unknown pathology   • Snoring       Family History   Problem Relation Age of Onset   • Heart Disease Mother            • Blood Disease Father         blood clot s/p knee surgery -         Review of Systems   Constitutional: Negative for chills and fever.   Respiratory: Negative for cough and shortness of breath.    Cardiovascular: Negative for chest pain and palpitations.   Gastrointestinal: Negative for constipation,  "diarrhea, nausea and vomiting.        Objective:     Vitals:    03/21/22 0804   BP: 140/78   BP Location: Right arm   Pulse: 60   Temp: 36.3 °C (97.4 °F)   SpO2: 98%   Weight: 96.6 kg (213 lb)   Height: 1.753 m (5' 9\")     Body mass index is 31.45 kg/m².     Physical Exam  Constitutional:       Appearance: Normal appearance.   HENT:      Head: Normocephalic and atraumatic.   Musculoskeletal:      Comments: Right knee: full range of motion, non tender to palpation, neg lachmans and anterior drawer, positive greg, mild effusion.    Neurological:      Mental Status: He is alert.          Assessment and Plan:   No problem-specific Assessment & Plan notes found for this encounter.      Followup: No follow-ups on file.    Silvano Magaña M.D.  "

## 2022-04-21 ENCOUNTER — HOSPITAL ENCOUNTER (OUTPATIENT)
Dept: RADIOLOGY | Facility: MEDICAL CENTER | Age: 62
End: 2022-04-21
Attending: FAMILY MEDICINE
Payer: COMMERCIAL

## 2022-04-21 DIAGNOSIS — G89.29 CHRONIC PAIN OF RIGHT KNEE: ICD-10-CM

## 2022-04-21 DIAGNOSIS — S83.8X1A INJURY OF MENISCUS OF RIGHT KNEE, INITIAL ENCOUNTER: ICD-10-CM

## 2022-04-21 DIAGNOSIS — M25.561 CHRONIC PAIN OF RIGHT KNEE: ICD-10-CM

## 2022-04-21 PROCEDURE — 73721 MRI JNT OF LWR EXTRE W/O DYE: CPT | Mod: RT

## 2022-04-25 ENCOUNTER — TELEPHONE (OUTPATIENT)
Dept: MEDICAL GROUP | Facility: CLINIC | Age: 62
End: 2022-04-25

## 2022-05-06 ENCOUNTER — OFFICE VISIT (OUTPATIENT)
Dept: MEDICAL GROUP | Facility: CLINIC | Age: 62
End: 2022-05-06
Payer: COMMERCIAL

## 2022-05-06 VITALS
TEMPERATURE: 97 F | OXYGEN SATURATION: 97 % | DIASTOLIC BLOOD PRESSURE: 68 MMHG | WEIGHT: 205.5 LBS | HEIGHT: 68 IN | SYSTOLIC BLOOD PRESSURE: 124 MMHG | HEART RATE: 65 BPM | BODY MASS INDEX: 31.14 KG/M2

## 2022-05-06 DIAGNOSIS — M17.31 POST-TRAUMATIC OSTEOARTHRITIS OF RIGHT KNEE: ICD-10-CM

## 2022-05-06 DIAGNOSIS — Q74.1 BIPARTITE PATELLA: ICD-10-CM

## 2022-05-06 PROCEDURE — 99213 OFFICE O/P EST LOW 20 MIN: CPT | Mod: GE | Performed by: STUDENT IN AN ORGANIZED HEALTH CARE EDUCATION/TRAINING PROGRAM

## 2022-05-06 RX ORDER — FLUOROURACIL 5 MG/G
CREAM TOPICAL
COMMUNITY
Start: 2021-06-30 | End: 2022-05-06

## 2022-05-06 NOTE — PROGRESS NOTES
"[facility] FAMILY MEDICINE OFFICE VISIT    Date: 5/6/2022    MRN: [id number]  Patient ID: Migel Owusu    SUBJECTIVE:  Migel Owusu is a 62 y.o. man here for follow-up review of his MRI imaging of right knee.  Patient reports that pain in his right knee has been present for approximately 2 to 3 years.  Denies any known injury, though does note that 1 day he came home after working on his feet all day and felt significant pain of the right knee.  Patient states that current pain is more \"all over\" the knee.  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  Patient has not tried any topical agents for alleviation of his pain.  Patient was referred to orthopedics for right shoulder and right knee pain, however states that orthopedics office only evaluated him for his right shoulder.  Migel states that he would like a referral to a different office, as urgently as possible, as inability to bear weight on his knee secondary to pain has been limiting his ability to work as well.    PMHx/PSHx:  Past Medical History:   Diagnosis Date   • Abdominal aortic aneurysm (HCC)    • Cancer of lower lip, vermilion border     Unknown pathology   • Snoring      Past Surgical History:   Procedure Laterality Date   • PB KNEE SCOPE,DIAGNOSTIC Right 11/30/2020    Procedure: ARTHROSCOPY, KNEE - DEBRIDEMENT;  Surgeon: Navdeep Aguilar M.D.;  Location: SURGERY [facility];  Service: Orthopedics   • CHONDROPLASTY Right 11/30/2020    Procedure: CHONDROPLASTY - FOR OPEN BIPARTITE PATELLA EXCISION;  Surgeon: Navdeep Aguilar M.D.;  Location: SURGERY [facility];  Service: Orthopedics   • ABDOMINAL AORTIC ANEURYSM  2011       Allergies: Patient has no known allergies.    OBJECTIVE:  Vitals:    05/06/22 1058   BP: 124/68   Pulse: 65   Temp: 36.1 °C (97 °F)   SpO2: 97%     Vitals:    05/06/22 1058   BP: 124/68   Weight: 93.2 kg (205 lb 8 oz)   Height: 1.727 m (5' 8\")       Physical " Examination:  General: Irate appearing man in no acute distress, resting on arrival to room  HEENT: Normocephalic, atraumatic, EOMI  Cardiovascular: No acrocyanosis, skin pink  Pulmonary: No tachypnea or retractions  Extremities: Moves all spontaneously, palpable crepitus overlying patella with right knee flexion and extension  Neurological: Alert and oriented    ASSESSMENT & PLAN:  Migel Owusu is a 62 y.o. man here for follow-up evaluation following MRI for chronic right knee pain, found to have bipartite patella and osteoarthritic change.    1. Bipartite patella  Referral to Orthopedics   2. Post-traumatic osteoarthritis of right knee  Referral to Orthopedics       Orders Placed This Encounter   • Referral to Orthopedics   • DISCONTD: fluoruracil (CARAC) 0.5 % cream       #Bipartite patella/osteoarthritis right knee  Reviewed MRI as well as x-ray results from patient's past visit with Migel.  Discussed with patient that these revealed chronic bipartite patella which appears to have healed in the past and then resected once again per MRI results.  Discussed with patient that between this and osteoarthritic change located on both x-ray and MRI, this is the most likely cause of his ongoing right knee pain.  Discussed the use of topical diclofenac gel 4 times daily as needed for knee pain, as this may significantly improve functional use.  Discussed with patient that given bipartite patella, treatment with joint injection may be of limited efficacy.  At this time have placed referral to orthopedics on an urgent basis for immediate evaluation such that patient can return to work as symptoms possible.  Referrals process discussed with Migel.  Patient verbalized agreement and understanding of plan of care.    Eddie Isaac M.D.  Family Medicine Resident  PGY-3

## (undated) DEVICE — SPONGE GAUZE STER 4X4 8-PL - (2/PK 50PK/BX 12BX/CS)

## (undated) DEVICE — DRAPE LARGE 3 QUARTER - (20/CA)

## (undated) DEVICE — GLOVE, LITE (PAIR)

## (undated) DEVICE — GLOVE BIOGEL SZ 8 SURGICAL PF LTX - (50PR/BX 4BX/CA)

## (undated) DEVICE — PADDING CAST 6 IN STERILE - 6 X 4 YDS (24/CA)

## (undated) DEVICE — SYRINGE 30 ML LL (56/BX)

## (undated) DEVICE — SODIUM CHL. IRRIGATION 0.9% 3000ML (4EA/CA 65CA/PF)

## (undated) DEVICE — PROTECTOR ULNA NERVE - (36PR/CA)

## (undated) DEVICE — PACK KNEE ARTHROSCOPY SM OR - (2EA/CA)

## (undated) DEVICE — LACTATED RINGERS INJ 1000 ML - (14EA/CA 60CA/PF)

## (undated) DEVICE — CHLORAPREP 26 ML APPLICATOR - ORANGE TINT(25/CA)

## (undated) DEVICE — NEPTUNE 4 PORT MANIFOLD - (20/PK)

## (undated) DEVICE — DRAPE U ORTHOPEDIC - (10/BX)

## (undated) DEVICE — GLOVE BIOGEL ECLIPSE PF LATEX SIZE 8.0  (50PR/BX)

## (undated) DEVICE — SUTURE GENERAL

## (undated) DEVICE — LEGGINGS IN-VIEW CLEAR POLY - (20PR/CA)

## (undated) DEVICE — IMMOBILIZER KNEE 20 INCH - (1/EA)

## (undated) DEVICE — STAPLER SKIN DISP - (6/BX 10BX/CA) VISISTAT

## (undated) DEVICE — SUTURE 3-0 ETHILON FS-1 - (36/BX) 30 INCH

## (undated) DEVICE — TUBING PUMP WITH CONNECTOR REDEUCE (1EA)

## (undated) DEVICE — SENSOR SPO2 NEO LNCS ADHESIVE (20/BX) SEE USER NOTES

## (undated) DEVICE — SHAVER4.0 AGGRESSIVE + FORMLA (5EA/BX)

## (undated) DEVICE — TUBING PATIENT W/CONNECTOR REDEUCE (1EA)

## (undated) DEVICE — TUBING, SPIROMETRY KIT

## (undated) DEVICE — SUTURE 2-0 VICRYL PLUS CT-1 36 (36PK/BX)"

## (undated) DEVICE — DRAPE IOBAN II INCISE 23X17 - (10EA/BX 4BX/CA)

## (undated) DEVICE — NEEDLE SAFETY 18 GA X 1 1/2 IN (100EA/BX)